# Patient Record
Sex: FEMALE | URBAN - METROPOLITAN AREA
[De-identification: names, ages, dates, MRNs, and addresses within clinical notes are randomized per-mention and may not be internally consistent; named-entity substitution may affect disease eponyms.]

---

## 2020-05-05 ENCOUNTER — APPOINTMENT (RX ONLY)
Dept: URBAN - METROPOLITAN AREA CLINIC 48 | Facility: CLINIC | Age: 45
Setting detail: DERMATOLOGY
End: 2020-05-05

## 2020-05-05 DIAGNOSIS — D22 MELANOCYTIC NEVI: ICD-10-CM

## 2020-05-05 DIAGNOSIS — B02.9 ZOSTER WITHOUT COMPLICATIONS: ICD-10-CM

## 2020-05-05 DIAGNOSIS — L81.4 OTHER MELANIN HYPERPIGMENTATION: ICD-10-CM

## 2020-05-05 PROBLEM — D22.9 MELANOCYTIC NEVI, UNSPECIFIED: Status: ACTIVE | Noted: 2020-05-05

## 2020-05-05 PROCEDURE — ? COUNSELING

## 2020-05-05 PROCEDURE — 99203 OFFICE O/P NEW LOW 30 MIN: CPT

## 2020-05-05 PROCEDURE — ? PRESCRIPTION

## 2021-07-08 ENCOUNTER — OFFICE (OUTPATIENT)
Dept: URBAN - METROPOLITAN AREA CLINIC 106 | Facility: CLINIC | Age: 46
End: 2021-07-08

## 2021-07-08 VITALS — TEMPERATURE: 97.1 F

## 2021-07-08 DIAGNOSIS — R10.10 UPPER ABDOMINAL PAIN, UNSPECIFIED: ICD-10-CM

## 2021-07-08 DIAGNOSIS — R63.0 DECREASE IN APPETITE: ICD-10-CM

## 2021-07-08 DIAGNOSIS — Z98.84 BARIATRIC SURGERY STATUS: ICD-10-CM

## 2021-07-08 DIAGNOSIS — R13.10 DIFFICULTY SWALLOWING: ICD-10-CM

## 2021-07-08 DIAGNOSIS — K21.9 GASTROESOPHAGEAL REFLUX DISEASE: ICD-10-CM

## 2021-07-08 PROCEDURE — 99215 OFFICE O/P EST HI 40 MIN: CPT

## 2021-07-08 NOTE — SERVICEHPINOTES
FONT style="BACKGROUND-COLOR: #ffffff" visited="true"Patient is a referral for: Complaints of difficulty swallowing her food and pills get stuck in her throat 2 or 3 times a week, with worsening poor oral intake, and increase nighttime acid reflux, complains of epigastric pain abdominal bloating and distention. Has a history of a gastric bypass in 2007.  No previous endoscopy since. Patient complains of on and off constipation diarrhea previous colonoscopy years ago unknown results.  No complaints of melena hematochezia.  Patient with ongoing lower extremity edema Denies chest pain shortness of breath fever or chills/FONT

## 2022-03-29 ENCOUNTER — OFFICE (OUTPATIENT)
Dept: URBAN - METROPOLITAN AREA CLINIC 106 | Facility: CLINIC | Age: 47
End: 2022-03-29

## 2022-03-29 VITALS — TEMPERATURE: 96.8 F | WEIGHT: 293 LBS

## 2022-03-29 DIAGNOSIS — R60.9 PERIPHERAL EDEMA: ICD-10-CM

## 2022-03-29 DIAGNOSIS — Z98.84 BARIATRIC SURGERY STATUS: ICD-10-CM

## 2022-03-29 DIAGNOSIS — K75.81 NASH (NONALCOHOLIC STEATOHEPATITIS): ICD-10-CM

## 2022-03-29 DIAGNOSIS — R10.10 UPPER ABDOMINAL PAIN, UNSPECIFIED: ICD-10-CM

## 2022-03-29 DIAGNOSIS — R63.0 DECREASE IN APPETITE: ICD-10-CM

## 2022-03-29 DIAGNOSIS — K27.9 PEPTIC ULCER DISEASE: ICD-10-CM

## 2022-03-29 DIAGNOSIS — K21.9 GASTROESOPHAGEAL REFLUX DISEASE: ICD-10-CM

## 2022-03-29 DIAGNOSIS — R74.8 ABNORMAL LIVER ENZYMES: ICD-10-CM

## 2022-03-29 DIAGNOSIS — R13.10 DIFFICULTY SWALLOWING: ICD-10-CM

## 2022-03-29 DIAGNOSIS — J44.9: ICD-10-CM

## 2022-03-29 PROCEDURE — 99215 OFFICE O/P EST HI 40 MIN: CPT | Performed by: INTERNAL MEDICINE

## 2022-03-29 RX ORDER — FUROSEMIDE 20 MG/1
TABLET ORAL
Qty: 90 | Status: ACTIVE
Start: 2022-03-29

## 2022-03-29 NOTE — SERVICEHPINOTES
LONI ANDINO   returns today for follow-up from last visit on   7/8/2021  .    Patient denies fever, nausea, vomiting, dysphagia, reflux, abdominal pain, change in bowel habits, constipation, diarrhea, rectal bleeding, melena, and significant change in weight. Denies shortness of breath and chest pain. The patient is seen for the evaluation of abnormal serum liver testing.    Abnormal liver enzyme values were initially identified   on routine exam  1  day   ago  .  The lab values were as follows:   SGOT =   68  , SGPT =   20  , ALK Phos. =   164  , T. Bilirubin =   0.7   ,    GGT =   Albumin =   3.0  Prothrombin time (INR) =    .    Pertinent symptoms reported by the patient include   chronic fatigue rapid weight gain  .    The patient identifies    as increasing the risk for contraction of viral hepatitis.  Additional notable medical history includes   obese habitus  .   To this point, evaluation of the patient's lab abnormalities has included   .